# Patient Record
Sex: FEMALE | Race: WHITE | NOT HISPANIC OR LATINO
[De-identification: names, ages, dates, MRNs, and addresses within clinical notes are randomized per-mention and may not be internally consistent; named-entity substitution may affect disease eponyms.]

---

## 2024-10-30 ENCOUNTER — APPOINTMENT (OUTPATIENT)
Dept: OBGYN | Facility: CLINIC | Age: 29
End: 2024-10-30
Payer: COMMERCIAL

## 2024-10-30 PROCEDURE — 76818 FETAL BIOPHYS PROFILE W/NST: CPT

## 2024-10-30 PROCEDURE — 0502F SUBSEQUENT PRENATAL CARE: CPT

## 2024-11-04 ENCOUNTER — APPOINTMENT (OUTPATIENT)
Dept: OBGYN | Facility: CLINIC | Age: 29
End: 2024-11-04
Payer: COMMERCIAL

## 2024-11-04 PROCEDURE — 0502F SUBSEQUENT PRENATAL CARE: CPT

## 2024-11-04 PROCEDURE — 76816 OB US FOLLOW-UP PER FETUS: CPT

## 2024-11-04 PROCEDURE — 76818 FETAL BIOPHYS PROFILE W/NST: CPT | Mod: 59

## 2024-11-04 PROCEDURE — 76820 UMBILICAL ARTERY ECHO: CPT | Mod: 59

## 2024-11-05 ENCOUNTER — APPOINTMENT (OUTPATIENT)
Dept: ANTEPARTUM | Facility: CLINIC | Age: 29
End: 2024-11-05
Payer: COMMERCIAL

## 2024-11-05 ENCOUNTER — OUTPATIENT (OUTPATIENT)
Dept: OUTPATIENT SERVICES | Facility: HOSPITAL | Age: 29
LOS: 1 days | End: 2024-11-05
Payer: SELF-PAY

## 2024-11-05 ENCOUNTER — ASOB RESULT (OUTPATIENT)
Age: 29
End: 2024-11-05

## 2024-11-05 VITALS — OXYGEN SATURATION: 95 % | HEART RATE: 58 BPM

## 2024-11-05 VITALS — HEART RATE: 55 BPM | OXYGEN SATURATION: 98 % | DIASTOLIC BLOOD PRESSURE: 84 MMHG | SYSTOLIC BLOOD PRESSURE: 115 MMHG

## 2024-11-05 DIAGNOSIS — O26.899 OTHER SPECIFIED PREGNANCY RELATED CONDITIONS, UNSPECIFIED TRIMESTER: ICD-10-CM

## 2024-11-05 PROCEDURE — 76818 FETAL BIOPHYS PROFILE W/NST: CPT | Mod: 26

## 2024-11-05 PROCEDURE — G0463: CPT

## 2024-11-05 PROCEDURE — 99221 1ST HOSP IP/OBS SF/LOW 40: CPT

## 2024-11-05 PROCEDURE — 76818 FETAL BIOPHYS PROFILE W/NST: CPT

## 2024-11-05 PROCEDURE — 59025 FETAL NON-STRESS TEST: CPT

## 2024-11-05 RX ORDER — CITRIC ACID/SODIUM CITRATE 334-500MG
15 SOLUTION, ORAL ORAL ONCE
Refills: 0 | Status: COMPLETED | OUTPATIENT
Start: 2024-11-05 | End: 2024-11-05

## 2024-11-05 RX ORDER — FAMOTIDINE 10 MG/ML
20 INJECTION INTRAVENOUS DAILY
Refills: 0 | Status: DISCONTINUED | OUTPATIENT
Start: 2024-11-05 | End: 2024-11-19

## 2024-11-05 RX ADMIN — FAMOTIDINE 20 MILLIGRAM(S): 10 INJECTION INTRAVENOUS at 12:50

## 2024-11-05 RX ADMIN — Medication 15 MILLILITER(S): at 12:12

## 2024-11-05 NOTE — OB PROVIDER TRIAGE NOTE - NSHPPHYSICALEXAM_GEN_ALL_CORE
PE:      T(C): 36.6 (11-05-24 @ 11:59), Max: 36.6 (11-05-24 @ 11:59)  HR: 61 (11-05-24 @ 12:23) (51 - 64)  BP: 115/84 (11-05-24 @ 11:59) (115/84 - 115/84)  RR: 20 (11-05-24 @ 11:59) (20 - 20)  SpO2: 95% (11-05-24 @ 12:23) (78% - 98%)    General: NAD, A&Ox3  CV: RRR  Lungs: Clear bilat   Abd: soft, nontender, gravid  VE: 0.5/0/-3  EFM: 140/moderate variablity/+accels/-decels  TOCO: none

## 2024-11-05 NOTE — OB PROVIDER TRIAGE NOTE - NSOBPROVIDERNOTE_OBGYN_ALL_OB_FT
A/P: 30yo  @ 40w2d here for ROL and found to not be in labor  -Pepcid and Bicitra for presumed acid reflux  - [facility]  - Pt to be discharged to home with labor precautions and reasons to return to the office    d/w Dr. Eric Lopez, PA-C

## 2024-11-05 NOTE — OB PROVIDER TRIAGE NOTE - IN ACCORDANCE WITH [place] LAW, WE OFFER EVERY PATIENT A HEPATITIS C TEST. WOULD YOU LIKE TO BE TESTED TODAY?
Previously negative [Eye Redness] : eye redness [Nasal Congestion] : nasal congestion [Sore Throat] : sore throat [PO Intolerance] : PO intolerance [Negative] : Genitourinary [Fever] : no fever [Chills] : no chills [Headache] : no headache [Eye Discharge] : no eye discharge [Ear Pain] : no ear pain [Nasal Discharge] : no nasal discharge [Cyanosis] : no cyanosis [Diaphoresis] : not diaphoretic [Tachypnea] : not tachypneic [Wheezing] : no wheezing [Cough] : no cough [Appetite Changes] : no appetite changes [Vomiting] : no vomiting [Diarrhea] : no diarrhea [Abdominal Pain] : no abdominal pain [Rash] : no rash

## 2024-11-05 NOTE — OB PROVIDER TRIAGE NOTE - HISTORY OF PRESENT ILLNESS
Pt is a 28yo  @ 40w2d here for with ctx q 15mins since this am. Pt endorses abdominal pain that radiates to the epigastric. Pt. denies LOF and vb. +FM. PNC uncomplicated GBS (-) EFW 3200g    OBHx: miscarriage  GYNHx: denies ovarian cysts, fibroids, hx of abnormal pap or STDs  PMHx: denies  PSurgHx: 2017: appendectomy; : cholecystectomy  Allergies: NKDA  Meds: PNV  Social: No smoking, alcohol, or illicit drug use  Psych: No hx of anxiety or depression

## 2024-11-05 NOTE — OB RN TRIAGE NOTE - FALL HARM RISK - UNIVERSAL INTERVENTIONS
Bed in lowest position, wheels locked, appropriate side rails in place/Call bell, personal items and telephone in reach/Instruct patient to call for assistance before getting out of bed or chair/Non-slip footwear when patient is out of bed/[place] to call system/Physically safe environment - no spills, clutter or unnecessary equipment/Purposeful Proactive Rounding/Room/bathroom lighting operational, light cord in reach

## 2024-11-07 ENCOUNTER — APPOINTMENT (OUTPATIENT)
Dept: OBGYN | Facility: CLINIC | Age: 29
End: 2024-11-07
Payer: COMMERCIAL

## 2024-11-07 ENCOUNTER — OUTPATIENT (OUTPATIENT)
Dept: OUTPATIENT SERVICES | Facility: HOSPITAL | Age: 29
LOS: 1 days | End: 2024-11-07
Payer: SELF-PAY

## 2024-11-07 VITALS
RESPIRATION RATE: 16 BRPM | DIASTOLIC BLOOD PRESSURE: 77 MMHG | SYSTOLIC BLOOD PRESSURE: 118 MMHG | HEART RATE: 61 BPM | TEMPERATURE: 98 F

## 2024-11-07 VITALS — DIASTOLIC BLOOD PRESSURE: 75 MMHG | SYSTOLIC BLOOD PRESSURE: 118 MMHG | HEART RATE: 54 BPM

## 2024-11-07 DIAGNOSIS — O26.899 OTHER SPECIFIED PREGNANCY RELATED CONDITIONS, UNSPECIFIED TRIMESTER: ICD-10-CM

## 2024-11-07 PROCEDURE — G0463: CPT

## 2024-11-07 PROCEDURE — 76816 OB US FOLLOW-UP PER FETUS: CPT

## 2024-11-07 PROCEDURE — 76818 FETAL BIOPHYS PROFILE W/NST: CPT | Mod: 59

## 2024-11-07 PROCEDURE — 0502F SUBSEQUENT PRENATAL CARE: CPT

## 2024-11-07 NOTE — OB PROVIDER TRIAGE NOTE - NSOBPROVIDERNOTE_OBGYN_ALL_OB_FT
Assessment  29y  at 40w2d presents for vaginal bleeding. No evidence of active vaginal bleeding on speculum exam. Patient took a photo of the bleeding, appears to be slightly bloody discharge, possibly bloody show. Patient was seen in triage on  for ROL, was 0.5cm dilated at that time. Now 2cm, bleeding possibly due to cervical change.      Plan  - d/c with labor precautions  - Pt counseled on return to triage if experiencing decreased fetal movement, regular contractions every 3-4 minutes, leakage of fluid, vaginal bleeding    Plan per attending physician, Dr. Eric Mosher PGY1

## 2024-11-07 NOTE — OB PROVIDER TRIAGE NOTE - HISTORY OF PRESENT ILLNESS
R1 Triage H&P    Subjective  HPI: 29y  at 40w4d presents for vaginal bleeding. +FM. -LOF. -CTXs. Pt denies any other concerns. Patient states that she had a small episode of vaginal bleeding this morning when she woke up. States it was bright red with some clear discharge. Denies continued vaginal bleeding, denies overt leakage of fluid.    – PNC: Denies prenatal issues. GBS neg.  EFW 6#13 two weeks ago per pt.  – OBHx: SABx1  – GynHx: denies fibroids, cysts, endometriosis, abnormal pap smears, STIs  – PMH: denies  – PSH: appendectomy, cholecystectomy  – Psych: denies   – Social: denies   – Meds: PNV   – Allergies: NKDA  – Will accept blood transfusions? Yes

## 2024-11-07 NOTE — OB PROVIDER TRIAGE NOTE - NSHPPHYSICALEXAM_GEN_ALL_CORE
Objective  – VS  T(C): 37 (11-07-24 @ 07:59)  HR: 78 (11-07-24 @ 08:23)  BP: 118/75 (11-07-24 @ 07:59)  RR: 16 (11-07-24 @ 07:59)  SpO2: 99% (11-07-24 @ 08:23)    Physical Exam  CV: RRR  Pulm: breathing comfortably on RA  Abd: gravid, nontender  Extr: moving all extremities with ease  – Spec: scant old blood, no active bleeding. No pooling.  – VE: 2/50/-3  – FHT: baseline 135, mod variability, +accels, -decels  – [place]: no ctx  – EFW: 6#13 two weeks ago per pt  – Sono: vertex

## 2024-11-08 ENCOUNTER — INPATIENT (INPATIENT)
Facility: HOSPITAL | Age: 29
LOS: 1 days | Discharge: HOME | DRG: 951 | End: 2024-11-10
Attending: OBSTETRICS & GYNECOLOGY | Admitting: OBSTETRICS & GYNECOLOGY
Payer: COMMERCIAL

## 2024-11-08 ENCOUNTER — APPOINTMENT (OUTPATIENT)
Dept: OBGYN | Facility: CLINIC | Age: 29
End: 2024-11-08
Payer: COMMERCIAL

## 2024-11-08 VITALS — OXYGEN SATURATION: 99 % | HEART RATE: 61 BPM

## 2024-11-08 DIAGNOSIS — Z34.80 ENCOUNTER FOR SUPERVISION OF OTHER NORMAL PREGNANCY, UNSPECIFIED TRIMESTER: ICD-10-CM

## 2024-11-08 DIAGNOSIS — K21.9 GASTRO-ESOPHAGEAL REFLUX DISEASE WITHOUT ESOPHAGITIS: ICD-10-CM

## 2024-11-08 DIAGNOSIS — O26.899 OTHER SPECIFIED PREGNANCY RELATED CONDITIONS, UNSPECIFIED TRIMESTER: ICD-10-CM

## 2024-11-08 DIAGNOSIS — O99.613 DISEASES OF THE DIGESTIVE SYSTEM COMPLICATING PREGNANCY, THIRD TRIMESTER: ICD-10-CM

## 2024-11-08 DIAGNOSIS — Z3A.40 40 WEEKS GESTATION OF PREGNANCY: ICD-10-CM

## 2024-11-08 DIAGNOSIS — O47.1 FALSE LABOR AT OR AFTER 37 COMPLETED WEEKS OF GESTATION: ICD-10-CM

## 2024-11-08 DIAGNOSIS — O09.293 SUPERVISION OF PREGNANCY WITH OTHER POOR REPRODUCTIVE OR OBSTETRIC HISTORY, THIRD TRIMESTER: ICD-10-CM

## 2024-11-08 DIAGNOSIS — O48.0 POST-TERM PREGNANCY: ICD-10-CM

## 2024-11-08 LAB
BASOPHILS # BLD AUTO: 0.01 K/UL — SIGNIFICANT CHANGE UP (ref 0–0.2)
BASOPHILS NFR BLD AUTO: 0.1 % — SIGNIFICANT CHANGE UP (ref 0–2)
EOSINOPHIL # BLD AUTO: 0.03 K/UL — SIGNIFICANT CHANGE UP (ref 0–0.5)
EOSINOPHIL NFR BLD AUTO: 0.4 % — SIGNIFICANT CHANGE UP (ref 0–6)
HCT VFR BLD CALC: 40.7 % — SIGNIFICANT CHANGE UP (ref 34.5–45)
HGB BLD-MCNC: 13.7 G/DL — SIGNIFICANT CHANGE UP (ref 11.5–15.5)
IMM GRANULOCYTES NFR BLD AUTO: 0.3 % — SIGNIFICANT CHANGE UP (ref 0–0.9)
LYMPHOCYTES # BLD AUTO: 2.1 K/UL — SIGNIFICANT CHANGE UP (ref 1–3.3)
LYMPHOCYTES # BLD AUTO: 30 % — SIGNIFICANT CHANGE UP (ref 13–44)
MCHC RBC-ENTMCNC: 28.7 PG — SIGNIFICANT CHANGE UP (ref 27–34)
MCHC RBC-ENTMCNC: 33.7 G/DL — SIGNIFICANT CHANGE UP (ref 32–36)
MCV RBC AUTO: 85.3 FL — SIGNIFICANT CHANGE UP (ref 80–100)
MONOCYTES # BLD AUTO: 0.73 K/UL — SIGNIFICANT CHANGE UP (ref 0–0.9)
MONOCYTES NFR BLD AUTO: 10.4 % — SIGNIFICANT CHANGE UP (ref 2–14)
NEUTROPHILS # BLD AUTO: 4.12 K/UL — SIGNIFICANT CHANGE UP (ref 1.8–7.4)
NEUTROPHILS NFR BLD AUTO: 58.8 % — SIGNIFICANT CHANGE UP (ref 43–77)
NRBC # BLD: 0 /100 WBCS — SIGNIFICANT CHANGE UP (ref 0–0)
PLATELET # BLD AUTO: 136 K/UL — LOW (ref 150–400)
RBC # BLD: 4.77 M/UL — SIGNIFICANT CHANGE UP (ref 3.8–5.2)
RBC # FLD: 14.5 % — SIGNIFICANT CHANGE UP (ref 10.3–14.5)
T PALLIDUM AB TITR SER: NEGATIVE — SIGNIFICANT CHANGE UP
WBC # BLD: 7.01 K/UL — SIGNIFICANT CHANGE UP (ref 3.8–10.5)
WBC # FLD AUTO: 7.01 K/UL — SIGNIFICANT CHANGE UP (ref 3.8–10.5)

## 2024-11-08 PROCEDURE — 59025 FETAL NON-STRESS TEST: CPT

## 2024-11-08 PROCEDURE — 99213 OFFICE O/P EST LOW 20 MIN: CPT | Mod: 25

## 2024-11-08 PROCEDURE — 59400 OBSTETRICAL CARE: CPT

## 2024-11-08 RX ORDER — SODIUM CHLORIDE 9 MG/ML
3 INJECTION, SOLUTION INTRAMUSCULAR; INTRAVENOUS; SUBCUTANEOUS EVERY 8 HOURS
Refills: 0 | Status: DISCONTINUED | OUTPATIENT
Start: 2024-11-08 | End: 2024-11-10

## 2024-11-08 RX ORDER — DIPHENHYDRAMINE HCL 12.5MG/5ML
25 ELIXIR ORAL EVERY 6 HOURS
Refills: 0 | Status: DISCONTINUED | OUTPATIENT
Start: 2024-11-08 | End: 2024-11-10

## 2024-11-08 RX ORDER — DIBUCAINE 1 %
1 OINTMENT (GRAM) TOPICAL EVERY 6 HOURS
Refills: 0 | Status: DISCONTINUED | OUTPATIENT
Start: 2024-11-08 | End: 2024-11-10

## 2024-11-08 RX ORDER — CHLORHEXIDINE GLUCONATE 40 MG/ML
1 SOLUTION TOPICAL DAILY
Refills: 0 | Status: DISCONTINUED | OUTPATIENT
Start: 2024-11-08 | End: 2024-11-09

## 2024-11-08 RX ORDER — CITRIC ACID/SODIUM CITRATE 334-500MG
15 SOLUTION, ORAL ORAL EVERY 6 HOURS
Refills: 0 | Status: DISCONTINUED | OUTPATIENT
Start: 2024-11-08 | End: 2024-11-09

## 2024-11-08 RX ORDER — SIMETHICONE 80 MG/1
80 TABLET, CHEWABLE ORAL EVERY 4 HOURS
Refills: 0 | Status: DISCONTINUED | OUTPATIENT
Start: 2024-11-08 | End: 2024-11-10

## 2024-11-08 RX ORDER — IBUPROFEN 200 MG
600 TABLET ORAL EVERY 6 HOURS
Refills: 0 | Status: COMPLETED | OUTPATIENT
Start: 2024-11-08 | End: 2025-10-07

## 2024-11-08 RX ORDER — OXYCODONE HYDROCHLORIDE 30 MG/1
5 TABLET ORAL
Refills: 0 | Status: DISCONTINUED | OUTPATIENT
Start: 2024-11-08 | End: 2024-11-10

## 2024-11-08 RX ORDER — MODIFIED LANOLIN
1 OINTMENT (GRAM) TOPICAL EVERY 6 HOURS
Refills: 0 | Status: DISCONTINUED | OUTPATIENT
Start: 2024-11-08 | End: 2024-11-10

## 2024-11-08 RX ORDER — OXYCODONE HYDROCHLORIDE 30 MG/1
5 TABLET ORAL ONCE
Refills: 0 | Status: DISCONTINUED | OUTPATIENT
Start: 2024-11-08 | End: 2024-11-10

## 2024-11-08 RX ORDER — KETOROLAC TROMETHAMINE 30 MG/ML
30 INJECTION INTRAMUSCULAR; INTRAVENOUS ONCE
Refills: 0 | Status: DISCONTINUED | OUTPATIENT
Start: 2024-11-08 | End: 2024-11-09

## 2024-11-08 RX ORDER — OXYTOCIN IN D5W-0.2% SODIUM CL 15/250 ML
4 PLASTIC BAG, INJECTION (ML) INTRAVENOUS
Qty: 30 | Refills: 0 | Status: DISCONTINUED | OUTPATIENT
Start: 2024-11-08 | End: 2024-11-09

## 2024-11-08 RX ORDER — HYDROCORTISONE 1 %
1 OINTMENT (GRAM) TOPICAL EVERY 6 HOURS
Refills: 0 | Status: DISCONTINUED | OUTPATIENT
Start: 2024-11-08 | End: 2024-11-10

## 2024-11-08 RX ORDER — PIPERACILLIN AND TAZOBACTAM .5; 4 G/20ML; G/20ML
4.5 INJECTION, POWDER, LYOPHILIZED, FOR SOLUTION INTRAVENOUS ONCE
Refills: 0 | Status: COMPLETED | OUTPATIENT
Start: 2024-11-08 | End: 2024-11-08

## 2024-11-08 RX ORDER — PRENATAL VIT/IRON FUM/FOLIC AC 60 MG-1 MG
1 TABLET ORAL DAILY
Refills: 0 | Status: DISCONTINUED | OUTPATIENT
Start: 2024-11-08 | End: 2024-11-10

## 2024-11-08 RX ORDER — PRAMOXINE HCL 1 %
1 CREAM (GRAM) RECTAL EVERY 4 HOURS
Refills: 0 | Status: DISCONTINUED | OUTPATIENT
Start: 2024-11-08 | End: 2024-11-10

## 2024-11-08 RX ORDER — CLOSTRIDIUM TETANI TOXOID ANTIGEN (FORMALDEHYDE INACTIVATED), CORYNEBACTERIUM DIPHTHERIAE TOXOID ANTIGEN (FORMALDEHYDE INACTIVATED), BORDETELLA PERTUSSIS TOXOID ANTIGEN (GLUTARALDEHYDE INACTIVATED), BORDETELLA PERTUSSIS FILAMENTOUS HEMAGGLUTININ ANTIGEN (FORMALDEHYDE INACTIVATED), BORDETELLA PERTUSSIS PERTACTIN ANTIGEN, AND BORDETELLA PERTUSSIS FIMBRIAE 2/3 ANTIGEN 5; 2; 2.5; 5; 3; 5 [LF]/.5ML; [LF]/.5ML; UG/.5ML; UG/.5ML; UG/.5ML; UG/.5ML
0.5 INJECTION, SUSPENSION INTRAMUSCULAR ONCE
Refills: 0 | Status: DISCONTINUED | OUTPATIENT
Start: 2024-11-08 | End: 2024-11-10

## 2024-11-08 RX ORDER — OXYTOCIN IN D5W-0.2% SODIUM CL 15/250 ML
167 PLASTIC BAG, INJECTION (ML) INTRAVENOUS
Qty: 30 | Refills: 0 | Status: DISCONTINUED | OUTPATIENT
Start: 2024-11-08 | End: 2024-11-10

## 2024-11-08 RX ORDER — MAGNESIUM HYDROXIDE 1200 MG/15ML
30 SUSPENSION ORAL
Refills: 0 | Status: DISCONTINUED | OUTPATIENT
Start: 2024-11-08 | End: 2024-11-10

## 2024-11-08 RX ORDER — ACETAMINOPHEN 500 MG
1000 TABLET ORAL ONCE
Refills: 0 | Status: COMPLETED | OUTPATIENT
Start: 2024-11-08 | End: 2024-11-08

## 2024-11-08 RX ORDER — BENZOCAINE 200 MG/G
1 GEL ORAL EVERY 6 HOURS
Refills: 0 | Status: DISCONTINUED | OUTPATIENT
Start: 2024-11-08 | End: 2024-11-10

## 2024-11-08 RX ORDER — ACETAMINOPHEN 500 MG
975 TABLET ORAL
Refills: 0 | Status: DISCONTINUED | OUTPATIENT
Start: 2024-11-08 | End: 2024-11-10

## 2024-11-08 RX ORDER — ONDANSETRON HYDROCHLORIDE 2 MG/ML
4 INJECTION, SOLUTION INTRAMUSCULAR; INTRAVENOUS ONCE
Refills: 0 | Status: COMPLETED | OUTPATIENT
Start: 2024-11-08 | End: 2024-11-08

## 2024-11-08 RX ORDER — INFLUENZ VIR VAC TV P-SURF2003 15MCG/.5ML
0.5 SYRINGE (ML) INTRAMUSCULAR ONCE
Refills: 0 | Status: DISCONTINUED | OUTPATIENT
Start: 2024-11-08 | End: 2024-11-10

## 2024-11-08 RX ADMIN — ONDANSETRON HYDROCHLORIDE 4 MILLIGRAM(S): 2 INJECTION, SOLUTION INTRAMUSCULAR; INTRAVENOUS at 20:17

## 2024-11-08 RX ADMIN — Medication 400 MILLIGRAM(S): at 21:22

## 2024-11-08 RX ADMIN — Medication 1000 MILLILITER(S): at 21:22

## 2024-11-08 RX ADMIN — PIPERACILLIN AND TAZOBACTAM 200 GRAM(S): .5; 4 INJECTION, POWDER, LYOPHILIZED, FOR SOLUTION INTRAVENOUS at 21:43

## 2024-11-08 RX ADMIN — Medication 4 MILLIUNIT(S)/MIN: at 15:17

## 2024-11-08 RX ADMIN — Medication 125 MILLILITER(S): at 15:16

## 2024-11-08 NOTE — OB PROVIDER H&P - ASSESSMENT
Assessment  29y  at 40w5d presents for management of labor.     Plan  1. Admit to L+D. Routine Labs. IVF.  2. Expectant management  3. Fetus: cat 1 tracing. VTX. EFW 7#11 yesterday per pt. Continuous EFM. Sono. No concerns.  4. Prenatal issues: none  5. GBS neg  6. Pain: IV pain meds/epidural PRN    Plan per attending physician, Dr. Eric Mosher PGY1

## 2024-11-08 NOTE — OB PROVIDER DELIVERY SUMMARY - NSPROVIDERDELIVERYNOTE_OBGYN_ALL_OB_FT
Pt presented in early labor.  Augmented with arom and pitocin.  Progressed to FD.  Pushed to undergo , baby boy 9.,9 apgars.  Second degree laceration repaired with 2.0 vicryl rapide.  Sphincter intact.    Fissure noted on posterior anus.    Rosio Reyes  OB attg

## 2024-11-08 NOTE — OB PROVIDER LABOR PROGRESS NOTE - ASSESSMENT
AROM CLEAR  WILL MONITOR CONTRACTIONS  START PITOCIN IF CONTRACTIONS SPACE OUT    jose stringer  ob attg
continue pitocin  cont peanut ball    Rosio Reyes  OB attg
28yo  at 40w5d admitted in labor.   Plan:  -Continue IV Pitocin  -Continue peanut ball  -Will continue to monitor EFM/ TOCO  -Epidural for pain management   -Anticipate    -Discussed with Dr. Reyes who is en route

## 2024-11-08 NOTE — OB PROVIDER H&P - NSLOWPPHRISK_OBGYN_A_OB
No previous uterine incision/[place] Pregnancy/Less than or equal to 4 previous vaginal births/No known bleeding disorder/No history of postpartum hemorrhage/No other PPH risks indicated

## 2024-11-08 NOTE — OB PROVIDER LABOR PROGRESS NOTE - NSVAGINALEXAM_OBGYN_ALL_OB_DT
Please inform patient that vitamin-D is mildly depressed, and hemoglobin A1c is mildly elevated, consistent with pre diabetes.  Recommend healthy diet and regular exercise.  Also, recommend vitamin D3 2000 international units daily over-the-counter.  Please advise if patient has had any relief with meloxicam for hip pain.  In addition, TSH, which is the test for thyroid is normal.  
08-Nov-2024 20:27
08-Nov-2024 14:44
08-Nov-2024 19:00

## 2024-11-08 NOTE — OB PROVIDER H&P - HISTORY OF PRESENT ILLNESS
Subjective  HPI: 29y  at 40w5d presents for induction of labor. Patient painfully tai and made cervical change documented in office, sent to L&D for induction of labor.     – PNC: Denies prenatal issues. GBS neg.  EFW 7#11 yesterday per pt  – OBHx: SABx1  – GynHx: denies fibroids, cysts, endometriosis, abnormal pap smears, STIs  – PMH: denies  – PSH: appendectomy, cholecystectomy  – Psych: denies   – Social: denies   – Meds: PNV   – Allergies: NKDA  – Will accept blood transfusions? Yes

## 2024-11-08 NOTE — OB RN DELIVERY SUMMARY - NS_LABORCHARACTER_OBGYN_ALL_OB
Induction of labor-AROM/Augmentation of labor/External electronic FM/Antibiotics in labor/Chorioamnionitis

## 2024-11-08 NOTE — OB RN PATIENT PROFILE - TOBACCO USE
Check here if all serologies below were negative, non-reactive or immune. Otherwise select appropriate status.
Never smoker

## 2024-11-08 NOTE — OB PROVIDER LABOR PROGRESS NOTE - NS_SUBJECTIVE/OBJECTIVE_OBGYN_ALL_OB_FT
pt is comfortable.  shaking
pt is comfortable with epidural
Patient seen and examined at bedside, c/o increased pressure. Vital signs are stable.     Vital Signs Last 24 Hrs  T(C): 37.3 (08 Nov 2024 19:22), Max: 37.3 (08 Nov 2024 19:22)  T(F): 99.14 (08 Nov 2024 19:22), Max: 99.14 (08 Nov 2024 19:22)  HR: 67 (08 Nov 2024 20:30) (50 - 101)  BP: 118/73 (08 Nov 2024 20:30) (109/78 - 172/62)  RR: 18 (08 Nov 2024 13:56) (18 - 18)  SpO2: 99% (08 Nov 2024 20:29) (87% - 100%)    Parameters below as of 08 Nov 2024 13:56  Patient On (Oxygen Delivery Method): room air

## 2024-11-08 NOTE — OB PROVIDER H&P - ATTENDING COMMENTS
29y  at 40w5d presents for management of labor.     Admit  labs  consents  efm/toco  IVH/clears  epidural prn  expectant management    Rosio Reyes  OB attg

## 2024-11-08 NOTE — OB PROVIDER H&P - NSHPPHYSICALEXAM_GEN_ALL_CORE
Objective  – VS  T(C): --  HR: 68 (11-08-24 @ 12:36)  BP: 121/80 (11-08-24 @ 12:25)  RR: --  SpO2: 98% (11-08-24 @ 12:36)    Physical Exam  CV: RRR  Pulm: breathing comfortably on RA  Abd: gravid, nontender  Extr: moving all extremities with ease  – VE: 3.5/80/-3 in office  – FHT: baseline 140, mod variability, +accels, -decels  – [place]: q4-7min  – EFW: 7#11 yesterday per pt  – Sono: vertex

## 2024-11-08 NOTE — OB RN DELIVERY SUMMARY - NSSELHIDDEN_OBGYN_ALL_OB_FT
[NS_DeliveryAttending1_OBGYN_ALL_OB_FT:[email or web address]],[NS_DeliveryRN_OBGYN_ALL_OB_FT:[email or web address]=]

## 2024-11-08 NOTE — OB RN DELIVERY SUMMARY - NS_SEPSISRSKCALC_OBGYN_ALL_OB_FT
EOS calculated successfully. EOS Risk Factor: 0.5/1000 live births ([facility] national incidence); GA=40w5d; Temp=100.58; ROM=8.2; GBS='Negative'; Antibiotics='No antibiotics or any antibiotics < 2 hrs prior to birth'

## 2024-11-08 NOTE — OB RN PATIENT PROFILE - BP NONINVASIVE DIASTOLIC (MM HG)
I spoke with Satinder at [facility] and answered a few additional questions to submit to PA team.  He said they will send me determination notification when decision has been made.      80

## 2024-11-08 NOTE — OB RN PATIENT PROFILE - FALL HARM RISK - UNIVERSAL INTERVENTIONS
Bed in lowest position, wheels locked, appropriate side rails in place/Call bell, personal items and telephone in reach/Instruct patient to call for assistance before getting out of bed or chair/Non-slip footwear when patient is out of bed/[place] to call system/Physically safe environment - no spills, clutter or unnecessary equipment/Purposeful Proactive Rounding/Room/bathroom lighting operational, light cord in reach

## 2024-11-09 LAB
ANION GAP SERPL CALC-SCNC: 11 MMOL/L — SIGNIFICANT CHANGE UP (ref 5–17)
BUN SERPL-MCNC: 9 MG/DL — SIGNIFICANT CHANGE UP (ref 7–23)
CALCIUM SERPL-MCNC: 8.6 MG/DL — SIGNIFICANT CHANGE UP (ref 8.4–10.5)
CHLORIDE SERPL-SCNC: 103 MMOL/L — SIGNIFICANT CHANGE UP (ref 96–108)
CO2 SERPL-SCNC: 19 MMOL/L — LOW (ref 22–31)
CREAT SERPL-MCNC: 0.76 MG/DL — SIGNIFICANT CHANGE UP (ref 0.5–1.3)
EGFR: 110 ML/MIN/1.73M2 — SIGNIFICANT CHANGE UP
GLUCOSE SERPL-MCNC: 85 MG/DL — SIGNIFICANT CHANGE UP (ref 70–99)
HCT VFR BLD CALC: 37 % — SIGNIFICANT CHANGE UP (ref 34.5–45)
HGB BLD-MCNC: 12.3 G/DL — SIGNIFICANT CHANGE UP (ref 11.5–15.5)
MAGNESIUM SERPL-MCNC: 1.8 MG/DL — SIGNIFICANT CHANGE UP (ref 1.6–2.6)
MCHC RBC-ENTMCNC: 28.8 PG — SIGNIFICANT CHANGE UP (ref 27–34)
MCHC RBC-ENTMCNC: 33.2 G/DL — SIGNIFICANT CHANGE UP (ref 32–36)
MCV RBC AUTO: 86.7 FL — SIGNIFICANT CHANGE UP (ref 80–100)
NRBC # BLD: 0 /100 WBCS — SIGNIFICANT CHANGE UP (ref 0–0)
PHOSPHATE SERPL-MCNC: 2.8 MG/DL — SIGNIFICANT CHANGE UP (ref 2.5–4.5)
PLATELET # BLD AUTO: 135 K/UL — LOW (ref 150–400)
POTASSIUM SERPL-MCNC: 3.6 MMOL/L — SIGNIFICANT CHANGE UP (ref 3.5–5.3)
POTASSIUM SERPL-SCNC: 3.6 MMOL/L — SIGNIFICANT CHANGE UP (ref 3.5–5.3)
RBC # BLD: 4.27 M/UL — SIGNIFICANT CHANGE UP (ref 3.8–5.2)
RBC # FLD: 14.3 % — SIGNIFICANT CHANGE UP (ref 10.3–14.5)
SODIUM SERPL-SCNC: 133 MMOL/L — LOW (ref 135–145)
TSH SERPL-MCNC: 1.7 UIU/ML — SIGNIFICANT CHANGE UP (ref 0.27–4.2)
WBC # BLD: 17.17 K/UL — HIGH (ref 3.8–10.5)
WBC # FLD AUTO: 17.17 K/UL — HIGH (ref 3.8–10.5)

## 2024-11-09 PROCEDURE — 93010 ELECTROCARDIOGRAM REPORT: CPT

## 2024-11-09 RX ORDER — MAGNESIUM SULFATE IN 0.9% NACL 2 G/50 ML
1 INTRAVENOUS SOLUTION, PIGGYBACK (ML) INTRAVENOUS ONCE
Refills: 0 | Status: COMPLETED | OUTPATIENT
Start: 2024-11-09 | End: 2024-11-09

## 2024-11-09 RX ORDER — IBUPROFEN 200 MG
600 TABLET ORAL EVERY 6 HOURS
Refills: 0 | Status: DISCONTINUED | OUTPATIENT
Start: 2024-11-09 | End: 2024-11-10

## 2024-11-09 RX ORDER — DIBASIC SODIUM PHOSPHATE, MONOBASIC POTASSIUM PHOSPHATE AND MONOBASIC SODIUM PHOSPHATE 852; 155; 130 MG/1; MG/1; MG/1
1 TABLET ORAL ONCE
Refills: 0 | Status: COMPLETED | OUTPATIENT
Start: 2024-11-09 | End: 2024-11-09

## 2024-11-09 RX ADMIN — Medication 975 MILLIGRAM(S): at 05:30

## 2024-11-09 RX ADMIN — Medication 1000 MILLILITER(S): at 07:01

## 2024-11-09 RX ADMIN — Medication 975 MILLIGRAM(S): at 06:30

## 2024-11-09 RX ADMIN — DIBASIC SODIUM PHOSPHATE, MONOBASIC POTASSIUM PHOSPHATE AND MONOBASIC SODIUM PHOSPHATE 1 TABLET(S): 852; 155; 130 TABLET ORAL at 17:18

## 2024-11-09 RX ADMIN — Medication 600 MILLIGRAM(S): at 16:12

## 2024-11-09 RX ADMIN — KETOROLAC TROMETHAMINE 30 MILLIGRAM(S): 30 INJECTION INTRAMUSCULAR; INTRAVENOUS at 01:44

## 2024-11-09 RX ADMIN — Medication 600 MILLIGRAM(S): at 22:08

## 2024-11-09 RX ADMIN — Medication 100 GRAM(S): at 17:17

## 2024-11-09 RX ADMIN — Medication 975 MILLIGRAM(S): at 11:51

## 2024-11-09 NOTE — PROVIDER CONTACT NOTE (OTHER) - BACKGROUND
NSD delivered at 2247 at 40.5 weeks  2nd degree lac. Mat temp 38R at 2107. SP zosyn x1 and IV tylenol x1

## 2024-11-09 NOTE — CONSULT NOTE ADULT - ASSESSMENT
HPI: 29y who is s/p  on 2024, c/b chorioamionitis, cardiology consulted for new sinus bradycardia    #sinus bradycardia  likely 2/2 to increased vagal tone during delivery  -order TSH  -daily bmp for Mg>2, K>4  -avoid AV varinder blocking agents  -monitor on telemetry HPI: 29y who is s/p  on 2024, c/b chorioamionitis, cardiology consulted for new sinus bradycardia    #sinus bradycardia  likely 2/2 to increased vagal tone during delivery. pt has chronotropic competence, normotensive.   -order TSH  -daily bmp for Mg>2, K>4  -avoid AV varinder blocking agents  -monitor on telemetry

## 2024-11-09 NOTE — CHART NOTE - NSCHARTNOTEFT_GEN_A_CORE
Pt seen and examined at bedside after nursing reported pt vitals revealed HR in the 40s. Pt's otherwise vitals wnl.     At bedside, pt noted to be asymptomatic. Pt was sitting in bed comfortably with  on chest. She denied dizziness,-light headedness, SOB, chest pain.   Pt fundus firm, lochia wnl.     Upon chart review, pt HR noted to be on lower end typically between 50s-60s.    - EKG  - Bolus  - Cont to monitor for symptoms  - Repeat vitals after bolus    Dilcia Carmichael PGY1 Pt seen and examined at bedside after nursing reported pt vitals revealed HR in the 40s. Pt's otherwise vitals wnl.     At bedside, pt noted to be asymptomatic. Pt was sitting in bed comfortably with  on chest. She denied dizziness,-light headedness, SOB, chest pain.   Pt fundus firm, lochia wnl.       - EKG  - Cont to monitor for symptoms    Addendum    - EKG: marked sinus andrey  - Cardiology consult    D/w Dr. Eric Carmichael PGY1

## 2024-11-09 NOTE — CHART NOTE - NSCHARTNOTEFT_GEN_A_CORE
In brief, patient is PPD #1 s/p , uncomplicated. Overnight patient noted to be persistently bradycardic, though asymptomatic. Has been evaluated by Cardiology team who recommended telemetry as well as TSH, daily BMP, Mg and to avoid AV varinder blocking agents.    Plan:  - In discussion with telemetry tech and nurse managers on 3KN and L&D, no telemetry capabilities for a postpartum patient anywhere in hospital besides labor and delivery  - Will transport patient to R for recommended telemetry monitoring  - CBC, TSH, BMP, Mg, Phos to be drawn when patient on labor floor    d/w attending, Dr. Eric Krause PGY3 In brief, patient is PPD #1 s/p , uncomplicated. Overnight patient noted to be persistently bradycardic, though asymptomatic. Has been evaluated by Cardiology team who recommended telemetry as well as TSH, daily BMP, Mg and to avoid AV varinder blocking agents. Recommended maintaining Mg >2 and K >4    Plan:  - In discussion with telemetry tech and nurse managers on 3KN and L&D, no telemetry capabilities for a postpartum patient anywhere in hospital besides labor and delivery  - Will transport patient to LDR for recommended telemetry monitoring  - CBC, TSH, BMP, Mg, Phos to be drawn when patient on labor floor    d/w attending, Dr. Eric Krause PGY3    **445pm clinical update**    - K 3.6, will replete with PO KPhos tab  - Mg 1.8, will replete with 1g Mg IVPB    Kamron Krause PGY3

## 2024-11-09 NOTE — CONSULT NOTE ADULT - SUBJECTIVE AND OBJECTIVE BOX
Cardiology Consult Note   [Please check [email or web address] password: [email or web address]" for cardiology service schedule and contact information]    HPI:  Subjective  HPI: 29y who is s/p  on 2024, c/b chorioamionitis        afebrile, P 40-60s, newly 40s after delivery, /77, RR 16, SpO2 99% on RA.   cbc wnl  ekg showing sinus bradycardia to 43    PAST MEDICAL & SURGICAL HISTORY:  History of appendectomy        FAMILY HISTORY:    SOCIAL HISTORY:  unchanged    MEDICATIONS:        acetaminophen     Tablet .. 975 milliGRAM(s) Oral <User Schedule>  diphenhydrAMINE 25 milliGRAM(s) Oral every 6 hours PRN  ibuprofen  Tablet. 600 milliGRAM(s) Oral every 6 hours  oxyCODONE    IR 5 milliGRAM(s) Oral every 3 hours PRN  oxyCODONE    IR 5 milliGRAM(s) Oral once PRN    magnesium hydroxide Suspension 30 milliLiter(s) Oral two times a day PRN  simethicone 80 milliGRAM(s) Chew every 4 hours PRN      benzocaine 20%/menthol 0.5% Spray 1 Spray(s) Topical every 6 hours PRN  dibucaine 1% Ointment 1 Application(s) Topical every 6 hours PRN  diphtheria/tetanus/pertussis (acellular) Vaccine (Adacel) 0.5 milliLiter(s) IntraMuscular once  hydrocortisone 1% Cream 1 Application(s) Topical every 6 hours PRN  influenza   Vaccine 0.5 milliLiter(s) IntraMuscular once  lanolin Ointment 1 Application(s) Topical every 6 hours PRN  oxytocin Infusion 167 milliUNIT(s)/Min IV Continuous <Continuous>  oxytocin Infusion 167 milliUNIT(s)/Min IV Continuous <Continuous>  pramoxine 1%/zinc 5% Cream 1 Application(s) Topical every 4 hours PRN  prenatal multivitamin 1 Tablet(s) Oral daily  sodium chloride 0.9% lock flush 3 milliLiter(s) IV Push every 8 hours  witch hazel Pads 1 Application(s) Topical every 4 hours PRN        -------------------------------------------------------------------------------------------  PHYSICAL EXAM:  T(C): 37 (24 @ 02:00), Max: 38.1 (24 @ 21:07)  HR: 44 (24 @ 02:00) (44 - 104)  BP: 124/77 (24 @ 02:00) (108/68 - 240/124)  RR: 16 (24 @ 02:00) (16 - 18)  SpO2: 99% (24 @ 02:00) (78% - 100%)  Wt(kg): --  I&O's Summary    2024 07:01  -  2024 05:20  --------------------------------------------------------  IN: 2939 mL / OUT: 1050 mL / NET: 1889 mL        GENERAL: NAD  HEAD: Atraumatic, Normocephalic.  ENT: Moist mucous membranes.  NECK: Supple, No JVD.  CHEST/LUNG: Clear to auscultation bilaterally; No rales, rhonchi, wheezing, or rubs. Unlabored respirations.  HEART: Regular rate and rhythm; No murmurs, rubs, or gallops.  ABDOMEN: Bowel sounds present; Soft, Nontender, Nondistended.   EXTREMITIES:  2+ Peripheral Pulses, brisk capillary refill. No clubbing, cyanosis, or edema.    -------------------------------------------------------------------------------------------  LABS:                          13.7   7.01  )-----------( 136      ( 2024 13:47 )             40.7                     acetaminophen     Tablet .. 975 milliGRAM(s) Oral <User Schedule>  diphenhydrAMINE 25 milliGRAM(s) Oral every 6 hours PRN  ibuprofen  Tablet. 600 milliGRAM(s) Oral every 6 hours  oxyCODONE    IR 5 milliGRAM(s) Oral every 3 hours PRN  oxyCODONE    IR 5 milliGRAM(s) Oral once PRN                       Cardiology Consult Note   [Please check [email or web address] password: [email or web address]" for cardiology service schedule and contact information]    HPI:  Subjective  HPI: 29y who is s/p  on 2024, c/b chorioamionitis    pt seen at bedside, no acute distress, not having any dizziness or lightheadedness or chest pain, never been told that she had a slow heart rate before  HR increased with increased exertion    afebrile, P 40-60s, newly 40s after delivery, /77, RR 16, SpO2 99% on RA.   cbc wnl  ekg showing sinus bradycardia to 43    pt not on any av varinder blocking agents    PAST MEDICAL & SURGICAL HISTORY:  History of appendectomy        FAMILY HISTORY:    SOCIAL HISTORY:  unchanged    MEDICATIONS:        acetaminophen     Tablet .. 975 milliGRAM(s) Oral <User Schedule>  diphenhydrAMINE 25 milliGRAM(s) Oral every 6 hours PRN  ibuprofen  Tablet. 600 milliGRAM(s) Oral every 6 hours  oxyCODONE    IR 5 milliGRAM(s) Oral every 3 hours PRN  oxyCODONE    IR 5 milliGRAM(s) Oral once PRN    magnesium hydroxide Suspension 30 milliLiter(s) Oral two times a day PRN  simethicone 80 milliGRAM(s) Chew every 4 hours PRN      benzocaine 20%/menthol 0.5% Spray 1 Spray(s) Topical every 6 hours PRN  dibucaine 1% Ointment 1 Application(s) Topical every 6 hours PRN  diphtheria/tetanus/pertussis (acellular) Vaccine (Adacel) 0.5 milliLiter(s) IntraMuscular once  hydrocortisone 1% Cream 1 Application(s) Topical every 6 hours PRN  influenza   Vaccine 0.5 milliLiter(s) IntraMuscular once  lanolin Ointment 1 Application(s) Topical every 6 hours PRN  oxytocin Infusion 167 milliUNIT(s)/Min IV Continuous <Continuous>  oxytocin Infusion 167 milliUNIT(s)/Min IV Continuous <Continuous>  pramoxine 1%/zinc 5% Cream 1 Application(s) Topical every 4 hours PRN  prenatal multivitamin 1 Tablet(s) Oral daily  sodium chloride 0.9% lock flush 3 milliLiter(s) IV Push every 8 hours  witch hazel Pads 1 Application(s) Topical every 4 hours PRN        -------------------------------------------------------------------------------------------  PHYSICAL EXAM:  T(C): 37 (24 @ 02:00), Max: 38.1 (24 @ 21:07)  HR: 44 (24 @ 02:00) (44 - 104)  BP: 124/77 (24 @ 02:00) (108/68 - 240/124)  RR: 16 (24 @ 02:00) (16 - 18)  SpO2: 99% (24 @ 02:00) (78% - 100%)  Wt(kg): --  I&O's Summary    2024 07:01  -  2024 05:20  --------------------------------------------------------  IN: 2939 mL / OUT: 1050 mL / NET: 1889 mL        GENERAL: NAD  HEAD: Atraumatic, Normocephalic.  ENT: Moist mucous membranes.  NECK: Supple, No JVD.  CHEST/LUNG: Clear to auscultation bilaterally; No rales, rhonchi, wheezing, or rubs. Unlabored respirations.  HEART: Regular rate and rhythm; No murmurs, rubs, or gallops.  ABDOMEN: Bowel sounds present; Soft, Nontender, Nondistended.   EXTREMITIES:  2+ Peripheral Pulses, brisk capillary refill. No clubbing, cyanosis, or edema.    -------------------------------------------------------------------------------------------  LABS:                          13.7   7.01  )-----------( 136       13:47 )             40.7                     acetaminophen     Tablet .. 975 milliGRAM(s) Oral <User Schedule>  diphenhydrAMINE 25 milliGRAM(s) Oral every 6 hours PRN  ibuprofen  Tablet. 600 milliGRAM(s) Oral every 6 hours  oxyCODONE    IR 5 milliGRAM(s) Oral every 3 hours PRN  oxyCODONE    IR 5 milliGRAM(s) Oral once PRN

## 2024-11-10 VITALS
HEART RATE: 66 BPM | DIASTOLIC BLOOD PRESSURE: 79 MMHG | TEMPERATURE: 98 F | SYSTOLIC BLOOD PRESSURE: 122 MMHG | OXYGEN SATURATION: 100 % | RESPIRATION RATE: 18 BRPM

## 2024-11-10 LAB
ANION GAP SERPL CALC-SCNC: 8 MMOL/L — SIGNIFICANT CHANGE UP (ref 5–17)
BUN SERPL-MCNC: 9 MG/DL — SIGNIFICANT CHANGE UP (ref 7–23)
CALCIUM SERPL-MCNC: 8.6 MG/DL — SIGNIFICANT CHANGE UP (ref 8.4–10.5)
CHLORIDE SERPL-SCNC: 107 MMOL/L — SIGNIFICANT CHANGE UP (ref 96–108)
CO2 SERPL-SCNC: 23 MMOL/L — SIGNIFICANT CHANGE UP (ref 22–31)
CREAT SERPL-MCNC: 0.73 MG/DL — SIGNIFICANT CHANGE UP (ref 0.5–1.3)
EGFR: 116 ML/MIN/1.73M2 — SIGNIFICANT CHANGE UP
GLUCOSE SERPL-MCNC: 78 MG/DL — SIGNIFICANT CHANGE UP (ref 70–99)
HCT VFR BLD CALC: 36.3 % — SIGNIFICANT CHANGE UP (ref 34.5–45)
HGB BLD-MCNC: 12 G/DL — SIGNIFICANT CHANGE UP (ref 11.5–15.5)
MAGNESIUM SERPL-MCNC: 1.8 MG/DL — SIGNIFICANT CHANGE UP (ref 1.6–2.6)
MCHC RBC-ENTMCNC: 28.6 PG — SIGNIFICANT CHANGE UP (ref 27–34)
MCHC RBC-ENTMCNC: 33.1 G/DL — SIGNIFICANT CHANGE UP (ref 32–36)
MCV RBC AUTO: 86.4 FL — SIGNIFICANT CHANGE UP (ref 80–100)
NRBC # BLD: 0 /100 WBCS — SIGNIFICANT CHANGE UP (ref 0–0)
PHOSPHATE SERPL-MCNC: 3.8 MG/DL — SIGNIFICANT CHANGE UP (ref 2.5–4.5)
PLATELET # BLD AUTO: 145 K/UL — LOW (ref 150–400)
POTASSIUM SERPL-MCNC: 4.3 MMOL/L — SIGNIFICANT CHANGE UP (ref 3.5–5.3)
POTASSIUM SERPL-SCNC: 4.3 MMOL/L — SIGNIFICANT CHANGE UP (ref 3.5–5.3)
RBC # BLD: 4.2 M/UL — SIGNIFICANT CHANGE UP (ref 3.8–5.2)
RBC # FLD: 14.7 % — HIGH (ref 10.3–14.5)
SODIUM SERPL-SCNC: 138 MMOL/L — SIGNIFICANT CHANGE UP (ref 135–145)
WBC # BLD: 12.98 K/UL — HIGH (ref 3.8–10.5)
WBC # FLD AUTO: 12.98 K/UL — HIGH (ref 3.8–10.5)

## 2024-11-10 PROCEDURE — 84443 ASSAY THYROID STIM HORMONE: CPT

## 2024-11-10 PROCEDURE — 86900 BLOOD TYPING SEROLOGIC ABO: CPT

## 2024-11-10 PROCEDURE — 36415 COLL VENOUS BLD VENIPUNCTURE: CPT

## 2024-11-10 PROCEDURE — 85027 COMPLETE CBC AUTOMATED: CPT

## 2024-11-10 PROCEDURE — 85025 COMPLETE CBC W/AUTO DIFF WBC: CPT

## 2024-11-10 PROCEDURE — 84100 ASSAY OF PHOSPHORUS: CPT

## 2024-11-10 PROCEDURE — 93005 ELECTROCARDIOGRAM TRACING: CPT

## 2024-11-10 PROCEDURE — 83735 ASSAY OF MAGNESIUM: CPT

## 2024-11-10 PROCEDURE — 86850 RBC ANTIBODY SCREEN: CPT

## 2024-11-10 PROCEDURE — 86901 BLOOD TYPING SEROLOGIC RH(D): CPT

## 2024-11-10 PROCEDURE — 86780 TREPONEMA PALLIDUM: CPT

## 2024-11-10 PROCEDURE — 80048 BASIC METABOLIC PNL TOTAL CA: CPT

## 2024-11-10 RX ORDER — PRENATAL VIT/IRON FUM/FOLIC AC 60 MG-1 MG
1 TABLET ORAL
Qty: 0 | Refills: 0 | DISCHARGE
Start: 2024-11-10

## 2024-11-10 RX ORDER — MAGNESIUM SULFATE IN 0.9% NACL 2 G/50 ML
2 INTRAVENOUS SOLUTION, PIGGYBACK (ML) INTRAVENOUS ONCE
Refills: 0 | Status: COMPLETED | OUTPATIENT
Start: 2024-11-10 | End: 2024-11-10

## 2024-11-10 RX ORDER — ACETAMINOPHEN 500 MG
3 TABLET ORAL
Qty: 0 | Refills: 0 | DISCHARGE
Start: 2024-11-10

## 2024-11-10 RX ORDER — IBUPROFEN 200 MG
1 TABLET ORAL
Qty: 0 | Refills: 0 | DISCHARGE
Start: 2024-11-10

## 2024-11-10 RX ADMIN — Medication 975 MILLIGRAM(S): at 04:23

## 2024-11-10 RX ADMIN — Medication 25 GRAM(S): at 09:19

## 2024-11-10 RX ADMIN — Medication 600 MILLIGRAM(S): at 10:28

## 2024-11-10 RX ADMIN — Medication 975 MILLIGRAM(S): at 13:30

## 2024-11-10 RX ADMIN — Medication 975 MILLIGRAM(S): at 13:00

## 2024-11-10 NOTE — DISCHARGE NOTE OB - FINANCIAL ASSISTANCE
[facility] provides services at a reduced cost to those who are determined to be eligible through [facility]’s financial assistance program. Information regarding [facility]’s financial assistance program can be found by going to [email or web address] or by calling [phone].

## 2024-11-10 NOTE — DISCHARGE NOTE OB - CARE PLAN
Principal Discharge DX:	Normal vaginal delivery  Assessment and plan of treatment:	po pain meds  ambulate  reg diet  Secondary Diagnosis:	Bradycardia  Assessment and plan of treatment:	if symptomatic to follow up with cardiology   1

## 2024-11-10 NOTE — PROGRESS NOTE ADULT - ASSESSMENT
A/P: 30yo PPD#1 s/p . Post partum c/b sinus bradycardia. Pt remains asymptomatic and states she overall feels well she is just fatigued from labor course.    - Pain well controlled, continue current pain regimen  - Increase ambulation, SCDs when not ambulating  - Continue regular diet  - OB cards consult per cardiology   - Continue close BP monitoring   - Encouraged to increase PO intake   - Routine post-partum care  - Will follow up with attending     Maria De Jesus Rojas PA-C
28y/o PPD#2 from  complicated by asymptomatic bradycardia to the 40s. Patient is currently in stable condition.    #Bradycardia  - Patient currently on telemetry, notable events overnight were bradycardia to the 40s  - Patient remains asymptomatic  - Follow up with cardiology in AM    #Routine Postpartum Care  - Continue with PO analgesia  - Increase ambulation  - Continue regular diet    JESSICA Mosher PGY1

## 2024-11-10 NOTE — DISCHARGE NOTE OB - PATIENT PORTAL LINK FT
You can access the FollowMyHealth Patient Portal offered by [facility] by registering at the following website: [email or web address]. By joining [facility]’s FollowMyHealth portal, you will also be able to view your health information using other applications (apps) compatible with our system.

## 2024-11-10 NOTE — DISCHARGE NOTE OB - MATERIALS PROVIDED
[facility] [place] Screening Program/Breastfeeding Log/Bottle Feeding Log/Breastfeeding Mother’s Support Group Information/Guide to Postpartum Care/[facility] Hearing Screen Program/Back To Sleep Handout/Shaken Baby Prevention Handout/Breastfeeding Guide and Packet/Birth Certificate Instructions

## 2024-11-10 NOTE — DISCHARGE NOTE OB - HOSPITAL COURSE
Uncomplicated .  PP sig for sinus bradycardia.  Monitored with telemetry for 24 hours.  Cleared by cardiology for d.c

## 2024-11-10 NOTE — PROGRESS NOTE ADULT - SUBJECTIVE AND OBJECTIVE BOX
Patient seen and examined at bedside, no acute overnight events. No acute complaints, pain well controlled. Patient is ambulating, voiding spontaneously, passing gas, and tolerating regular diet. Denies CP, SOB, N/V, HA, blurred vision, epigastric pain.    Vital Signs Last 24 Hours  T(C): 36.7 (11-09-24 @ 20:33), Max: 37 (11-09-24 @ 17:49)  HR: 57 (11-09-24 @ 20:33) (44 - 62)  BP: 135/73 (11-09-24 @ 20:33) (106/60 - 135/73)  RR: 18 (11-09-24 @ 20:33) (12 - 20)  SpO2: 96% (11-09-24 @ 20:33) (96% - 100%)    Physical Exam:  General: NAD  Abdomen: Soft, non-tender, non-distended, fundus firm  Pelvic: Lochia wnl, external exam of perineum clean and dry without swelling      11-08-24 @ 07:01  -  11-09-24 @ 07:00  --------------------------------------------------------  IN: 2939 mL / OUT: 1150 mL / NET: 1789 mL    11-09-24 @ 07:01  -  11-10-24 @ 04:38  --------------------------------------------------------  IN: 0 mL / OUT: 3750 mL / NET: -3750 mL        Blood Type: A Positive  Antibody Screen: Negative  RPR: Negative               12.3   17.17 )-----------( 135      ( 11-09 @ 12:14 )             37.0                13.7   7.01  )-----------( 136      ( 11-08 @ 13:47 )             40.7         MEDICATIONS  (STANDING):  acetaminophen     Tablet .. 975 milliGRAM(s) Oral <User Schedule>  diphtheria/tetanus/pertussis (acellular) Vaccine (Adacel) 0.5 milliLiter(s) IntraMuscular once  ibuprofen  Tablet. 600 milliGRAM(s) Oral every 6 hours  influenza   Vaccine 0.5 milliLiter(s) IntraMuscular once  oxytocin Infusion 167 milliUNIT(s)/Min (167 mL/Hr) IV Continuous <Continuous>  oxytocin Infusion 167 milliUNIT(s)/Min (167 mL/Hr) IV Continuous <Continuous>  prenatal multivitamin 1 Tablet(s) Oral daily  sodium chloride 0.9% lock flush 3 milliLiter(s) IV Push every 8 hours    MEDICATIONS  (PRN):  benzocaine 20%/menthol 0.5% Spray 1 Spray(s) Topical every 6 hours PRN for Perineal discomfort  dibucaine 1% Ointment 1 Application(s) Topical every 6 hours PRN Perineal discomfort  diphenhydrAMINE 25 milliGRAM(s) Oral every 6 hours PRN Pruritus  hydrocortisone 1% Cream 1 Application(s) Topical every 6 hours PRN Moderate Pain (4-6)  lanolin Ointment 1 Application(s) Topical every 6 hours PRN nipple soreness  magnesium hydroxide Suspension 30 milliLiter(s) Oral two times a day PRN Constipation  oxyCODONE    IR 5 milliGRAM(s) Oral every 3 hours PRN Moderate to Severe Pain (4-10)  oxyCODONE    IR 5 milliGRAM(s) Oral once PRN Moderate to Severe Pain (4-10)  pramoxine 1%/zinc 5% Cream 1 Application(s) Topical every 4 hours PRN Moderate Pain (4-6)  simethicone 80 milliGRAM(s) Chew every 4 hours PRN Gas  witch hazel Pads 1 Application(s) Topical every 4 hours PRN Perineal discomfort  
OB PA Progress Note:  PPD#1    29yoF P1 PPD#1 s/p . Pt seen laying in bed with baby comfortably, pt Nepalese speaking states she feels well just very tired. Her pain is well controlled. She is tolerating a regular diet and states she has passed flatus once. She is voiding freely and has ambulated to the bathroom with assistance overnight 2/2 bradycardia (HR 45). Pt remains asymptomatic, denies any lightheadedness, dizziness, chest pain, SOB, n/v/d. Pt evaluated by cardiology overnight, OB cards to be consulted.     Vital Signs Last 24 Hrs  T(C): 36.7 (2024 05:14), Max: 38.1 (2024 21:07)  T(F): 98.1 (2024 05:14), Max: 100.58 (2024 21:07)  HR: 44 (2024 05:14) (44 - 104)  BP: 116/76 (2024 05:14) (108/68 - 240/124)  RR: 18 (2024 05:14) (16 - 18)  SpO2: 99% (2024 05:14) (78% - 100%)    Blood type: A Positive  Rubella IgG: Immune   RPR: Negative                          13.7   7.01 >-----------< 136[L]    ( 08 @ 13:47 )             40.7    General: NAD  Abdomen: soft, non-tender, non-distended, fundus firm and below the umbilicus   Vaginal: Lochia wnl  Extremities: BLE non-edematous, no calf tenderness b/l

## 2024-11-10 NOTE — PROGRESS NOTE ADULT - ATTENDING COMMENTS
Patient seen and examined.  Agree with above.  MG 1.8  hr in 40s last night - again some bradycardia this am.  pt asymptomatic    Regular diet  ambulate  po pain meds  replete mg  cards consult - regarding coming off telemetry    Rosio Reyes MD  OB attg
Patient seen and examined.  Agree with above.    Discussed with pt with - low heart rate  reviewed cardiology recommendations for telemetry  explained to pt will go down to l&d for monitoring  pt expressed understanding    Rosio Reyes MD  OB attg

## 2024-11-10 NOTE — DISCHARGE NOTE OB - CARE PROVIDER_API CALL
Rosio Reyes  Obstetrics and Gynecology  [address] [place] [address], [address]  [place] [address]  Phone: [phone]  Fax: [phone]  Follow Up Time: Routine

## 2024-11-10 NOTE — CHART NOTE - NSCHARTNOTEFT_GEN_A_CORE
Patient remained in LDR for telemetry monitoring overnight, with intermittent sinus bradycardia to julissa of 48bpm. Patient remains asymptomatic. Discussed telemetry with Cardiology on call fellow, patient is cleared to discontinue telemetry monitoring with no contraindications to discharge to home. Patient seen by primary OB attending, Dr. Reyes, also in agreement with plan for discontinuation of telemetry and discharge to home.     Plan:  - Patient cleared for discharge to home, will be transferred back to PP floor for discharge per protocol    d/w attending, Dr. Eric Krause PGY3

## 2024-11-11 DIAGNOSIS — Z3A.40 40 WEEKS GESTATION OF PREGNANCY: ICD-10-CM

## 2024-11-11 DIAGNOSIS — O09.293 SUPERVISION OF PREGNANCY WITH OTHER POOR REPRODUCTIVE OR OBSTETRIC HISTORY, THIRD TRIMESTER: ICD-10-CM

## 2024-11-11 DIAGNOSIS — O46.93 ANTEPARTUM HEMORRHAGE, UNSPECIFIED, THIRD TRIMESTER: ICD-10-CM

## 2024-12-09 ENCOUNTER — APPOINTMENT (OUTPATIENT)
Dept: OBGYN | Facility: CLINIC | Age: 29
End: 2024-12-09
Payer: COMMERCIAL

## 2024-12-09 PROCEDURE — 0503F POSTPARTUM CARE VISIT: CPT

## 2025-03-21 NOTE — OB RN DELIVERY SUMMARY - NS_NUCHALCORD_OBGYN_ALL_OB
None The Delivery OB Provider certifies that vaginal examination and/or abdominal examination after the delivery was done and no foreign body was found.